# Patient Record
Sex: MALE | URBAN - METROPOLITAN AREA
[De-identification: names, ages, dates, MRNs, and addresses within clinical notes are randomized per-mention and may not be internally consistent; named-entity substitution may affect disease eponyms.]

---

## 2020-12-01 ENCOUNTER — WALK IN (OUTPATIENT)
Dept: URGENT CARE | Age: 55
End: 2020-12-01

## 2020-12-01 DIAGNOSIS — R51.9 NONINTRACTABLE EPISODIC HEADACHE, UNSPECIFIED HEADACHE TYPE: ICD-10-CM

## 2020-12-01 DIAGNOSIS — R19.7 DIARRHEA, UNSPECIFIED TYPE: ICD-10-CM

## 2020-12-01 DIAGNOSIS — R05.9 COUGH: ICD-10-CM

## 2020-12-01 DIAGNOSIS — J06.9 UPPER RESPIRATORY TRACT INFECTION, UNSPECIFIED TYPE: ICD-10-CM

## 2020-12-01 DIAGNOSIS — Z20.822 SUSPECTED COVID-19 VIRUS INFECTION: Primary | ICD-10-CM

## 2020-12-01 LAB — SARS-COV-2 AG RESP QL IA.RAPID: NOT DETECTED

## 2020-12-01 PROCEDURE — 99202 OFFICE O/P NEW SF 15 MIN: CPT | Performed by: FAMILY MEDICINE

## 2020-12-01 PROCEDURE — 87426 SARSCOV CORONAVIRUS AG IA: CPT | Performed by: FAMILY MEDICINE

## 2020-12-01 ASSESSMENT — PAIN SCALES - GENERAL: PAINLEVEL: 3-4

## 2021-05-26 VITALS
DIASTOLIC BLOOD PRESSURE: 72 MMHG | OXYGEN SATURATION: 97 % | HEART RATE: 72 BPM | RESPIRATION RATE: 16 BRPM | SYSTOLIC BLOOD PRESSURE: 138 MMHG | TEMPERATURE: 97.8 F

## 2023-11-03 ENCOUNTER — WALK IN (OUTPATIENT)
Dept: URGENT CARE | Age: 58
End: 2023-11-03

## 2023-11-03 VITALS
HEART RATE: 70 BPM | OXYGEN SATURATION: 98 % | DIASTOLIC BLOOD PRESSURE: 72 MMHG | RESPIRATION RATE: 16 BRPM | SYSTOLIC BLOOD PRESSURE: 120 MMHG | TEMPERATURE: 98.3 F

## 2023-11-03 DIAGNOSIS — J32.9 SINUSITIS, UNSPECIFIED CHRONICITY, UNSPECIFIED LOCATION: Primary | ICD-10-CM

## 2023-11-03 PROCEDURE — 99214 OFFICE O/P EST MOD 30 MIN: CPT | Performed by: FAMILY MEDICINE

## 2023-11-03 RX ORDER — CEFUROXIME AXETIL 500 MG/1
500 TABLET ORAL 2 TIMES DAILY
Qty: 20 TABLET | Refills: 0 | Status: SHIPPED | OUTPATIENT
Start: 2023-11-03 | End: 2023-11-13

## 2024-10-22 ENCOUNTER — OFFICE VISIT (OUTPATIENT)
Dept: FAMILY MEDICINE CLINIC | Facility: CLINIC | Age: 59
End: 2024-10-22
Payer: COMMERCIAL

## 2024-10-22 VITALS
TEMPERATURE: 98 F | SYSTOLIC BLOOD PRESSURE: 118 MMHG | HEART RATE: 74 BPM | BODY MASS INDEX: 28.72 KG/M2 | WEIGHT: 231 LBS | HEIGHT: 75 IN | OXYGEN SATURATION: 97 % | DIASTOLIC BLOOD PRESSURE: 64 MMHG | RESPIRATION RATE: 16 BRPM

## 2024-10-22 DIAGNOSIS — Z12.11 SCREENING FOR COLON CANCER: ICD-10-CM

## 2024-10-22 DIAGNOSIS — Z00.00 ANNUAL PHYSICAL EXAM: Primary | ICD-10-CM

## 2024-10-22 DIAGNOSIS — Z28.21 VACCINATION REFUSED BY PATIENT: ICD-10-CM

## 2024-10-22 DIAGNOSIS — Z12.5 SCREENING FOR PROSTATE CANCER: ICD-10-CM

## 2024-10-22 PROCEDURE — 99386 PREV VISIT NEW AGE 40-64: CPT | Performed by: NURSE PRACTITIONER

## 2024-10-22 NOTE — PROGRESS NOTES
CHIEF COMPLAINT:    Chief Complaint   Patient presents with    Physical     New patient, establish care       HISTORY OF PRESENT ILLNESS:    Von Lauren is a 59 year old male who has a history of hernia repair and allergy to ciprofloxacin presents today, October 22, 2024, for an annual physical.    - NUTRITION:  Follows a regular diet.  Drinks approximately 64oz of water a day.   - SLEEP:  Sleeps throughout the night.  Wakes up feeling rested.  Denies snoring.   - SAFETY:  Reports feeling safe at home.  Wears seatbelt   - PHYSICAL ACTIVITY/SOCIAL/HOBBIES:  Achieves 2.5hr of exercise a week.  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   - GOAL:  Von would like to improve health overall by     COLONOSCOPY:      IMMUNIZATIONS:  Declines today    There is no problem list on file for this patient.    ALLERGIES:  Allergies[1]    CURRENT MEDICATIONS:  No current outpatient medications on file.       MEDICAL HISTORY:  History reviewed. No pertinent past medical history.  Past Surgical History:   Procedure Laterality Date    Hernia repair  2004     Family History   Problem Relation Age of Onset    Breast Cancer Mother     Arthritis Mother     No Known Problems Father     No Known Problems Daughter      Family Status   Relation Status    Mo (Not Specified)    Fa (Not Specified)    Sandi Alive     Social History     Socioeconomic History    Marital status:    Tobacco Use    Smoking status: Never     Passive exposure: Never    Smokeless tobacco: Never   Vaping Use    Vaping status: Never Used   Substance and Sexual Activity    Alcohol use: Yes     Comment: occ    Drug use: Not Currently     Social Drivers of Health      Received from [facility]    Social Connections    Received from [facility]    Housing Stability       ROS:    GENERAL:  Denies fever or chills  RESPIRATORY:  Denies difficulty breathing  CARDIAC:  Denies chest pain with exertion  GI:   Denies nausea, vomiting, diarrhea, constipation, or blood in stool  :  Denies blood in urine or painful urination  REPRO:  Denies penile discharge or testicular pain  NEURO:  Denies recent falls   MSKL:  Denies joint stiffness or pain  SKIN:  Denies change in texture of moles   PSYCH: Denies thoughts of self harm or harming others     VITALS:    /64   Pulse 74   Temp 98 °F (36.7 °C) (Temporal)   Resp 16   Ht 6' 3\" (1.905 m)   Wt 231 lb (104.8 kg)   SpO2 97%   BMI 28.87 kg/m²     Ideal body weight: 84.5 kg (186 lb 4.6 oz)  Adjusted ideal body weight: 92.6 kg (204 lb 2.8 oz)  Wt Readings from Last 3 Encounters:   10/22/24 231 lb (104.8 kg)     BP Readings from Last 3 Encounters:   10/22/24 118/64     Reviewed by Kyleigh Talavera MS, APRN, FNP-BC    PHYSICAL EXAM:    Constitutional:       Appearance: Normal appearance.  Sitting upright on exam table.  Well developed, well nourished, and in no acute distress.  HENT:      Head: Facial features symmetric. Normocephalic and atraumatic.      Right Ear: Canal clear without erythema or drainage.  TM clear and intact, neutral in position.      Left Ear: Canal clear without erythema or drainage.  TM clear and intact, neutral in position.      Nose: Nose normal.      Mouth/Throat: Mucous membranes are moist.  Uvula rises midline.  Eyes:      Extraocular Movements: Extraocular movements intact.      Conjunctiva/sclera: Conjunctivae normal. Sclera anicteric         Pupils: Pupils are equal, round, and reactive to light.   Neck:     Neck is supple. Trachea is midline.  No masses.      No obvious lympadenopathy with palpation of submandibular, pre/posterior auricular, anterior/posterior cervical, occipital, and supraclavicular nodes.  Cardiovascular:      Heart sounds: Regular rate and rhythm without murmur.     BLE without edema.  Pulmonary:      Effort: Pulmonary effort is normal.      Breath sounds: Lungs clear throughout.     No cough or wheezing.  Abdominal:       General: Abdomen is nondistended, bowel sounds normoactive, soft, nontender.  No organomegaly.  Musculoskeletal:         General: Normal range of motion. Strength of extremities are equal bilaterally.     Range of motion without limitations.  Skin:     General: Skin is warm and dry.      Coloration: Skin is without jaundice     Findings: No bruising or rashes  Neurological:      General: No focal deficit present. Speech is clear and organized.     Mental Status: Alert and oriented to person, place, and time.      Sensory: Sensation is intact.      Motor: Motor function is intact. Movements are smooth and controlled without ataxia.     Coordination: Coordination is intact. Coordination normal.      Gait: Gait steady and nonantalgic.      Deep Tendon Reflexes: Reflexes 2+ bilaterally.  Psychiatric:         Mood and Affect: Mood normal.         Behavior: Behavior normal.         Thought Content: Thought content normal.         Judgment: Judgment normal.     ASSESSMENT & PLAN:  Plan on follow-up in 1 year or earlier if needed  Refer to result notes for further information    1. Annual physical exam  Denies concerns or questions regarding health overall  - CBC With Differential With Platelet  - Comp Metabolic Panel (14)  - Lipid Panel  - TSH W Reflex To Free T4  - PSA - Total [5363][Q]    2. Screening for prostate cancer  - PSA - Total [5363][Q]    3. Screening for colon cancer  - SURGERY - INTERNAL    4. Vaccination refused by patient  Declines today       [1]   Allergies  Allergen Reactions    Ciprofloxacin OTHER (SEE COMMENTS) and RASH     tiredness

## 2024-11-27 LAB
ABSOLUTE BASOPHILS: 41 CELLS/UL (ref 0–200)
ABSOLUTE EOSINOPHILS: 203 CELLS/UL (ref 15–500)
ABSOLUTE LYMPHOCYTES: 1814 CELLS/UL (ref 850–3900)
ABSOLUTE MONOCYTES: 867 CELLS/UL (ref 200–950)
ABSOLUTE NEUTROPHILS: 5176 CELLS/UL (ref 1500–7800)
ALBUMIN/GLOBULIN RATIO: 2 (CALC) (ref 1–2.5)
ALBUMIN: 4.1 G/DL (ref 3.6–5.1)
ALKALINE PHOSPHATASE: 34 U/L (ref 35–144)
ALT: 19 U/L (ref 9–46)
AST: 19 U/L (ref 10–35)
BASOPHILS: 0.5 %
BILIRUBIN, TOTAL: 2 MG/DL (ref 0.2–1.2)
BUN: 20 MG/DL (ref 7–25)
CALCIUM: 9.1 MG/DL (ref 8.6–10.3)
CARBON DIOXIDE: 28 MMOL/L (ref 20–32)
CHLORIDE: 105 MMOL/L (ref 98–110)
CHOL/HDLC RATIO: 3.1 (CALC)
CHOLESTEROL, TOTAL: 184 MG/DL
CREATININE: 0.8 MG/DL (ref 0.7–1.35)
EGFR: 101 ML/MIN/1.73M2
EOSINOPHILS: 2.5 %
GLOBULIN: 2.1 G/DL (CALC) (ref 1.9–3.7)
GLUCOSE: 102 MG/DL (ref 65–99)
HDL CHOLESTEROL: 59 MG/DL
HEMATOCRIT: 50.7 % (ref 38.5–50)
HEMOGLOBIN: 16.1 G/DL (ref 13.2–17.1)
LDL-CHOLESTEROL: 110 MG/DL (CALC)
LYMPHOCYTES: 22.4 %
MCH: 29.2 PG (ref 27–33)
MCHC: 31.8 G/DL (ref 32–36)
MCV: 91.8 FL (ref 80–100)
MONOCYTES: 10.7 %
MPV: 10.8 FL (ref 7.5–12.5)
NEUTROPHILS: 63.9 %
NON-HDL CHOLESTEROL: 125 MG/DL (CALC)
PLATELET COUNT: 343 THOUSAND/UL (ref 140–400)
POTASSIUM: 4.5 MMOL/L (ref 3.5–5.3)
PROTEIN, TOTAL: 6.2 G/DL (ref 6.1–8.1)
PSA, TOTAL: 2.73 NG/ML
RDW: 12.5 % (ref 11–15)
RED BLOOD CELL COUNT: 5.52 MILLION/UL (ref 4.2–5.8)
SODIUM: 140 MMOL/L (ref 135–146)
TRIGLYCERIDES: 64 MG/DL
TSH W/REFLEX TO FT4: 1.62 MIU/L (ref 0.4–4.5)
WHITE BLOOD CELL COUNT: 8.1 THOUSAND/UL (ref 3.8–10.8)